# Patient Record
Sex: MALE | Race: WHITE | NOT HISPANIC OR LATINO | Employment: FULL TIME | URBAN - METROPOLITAN AREA
[De-identification: names, ages, dates, MRNs, and addresses within clinical notes are randomized per-mention and may not be internally consistent; named-entity substitution may affect disease eponyms.]

---

## 2017-01-31 ENCOUNTER — OFFICE VISIT (OUTPATIENT)
Dept: FAMILY MEDICINE | Facility: CLINIC | Age: 32
End: 2017-01-31

## 2017-01-31 VITALS
WEIGHT: 145.8 LBS | OXYGEN SATURATION: 98 % | RESPIRATION RATE: 20 BRPM | TEMPERATURE: 97.6 F | DIASTOLIC BLOOD PRESSURE: 77 MMHG | HEIGHT: 69 IN | SYSTOLIC BLOOD PRESSURE: 133 MMHG | BODY MASS INDEX: 21.59 KG/M2 | HEART RATE: 64 BPM

## 2017-01-31 DIAGNOSIS — Z11.3 SCREEN FOR STD (SEXUALLY TRANSMITTED DISEASE): ICD-10-CM

## 2017-01-31 DIAGNOSIS — R53.83 FATIGUE, UNSPECIFIED TYPE: Primary | ICD-10-CM

## 2017-01-31 LAB
BUN SERPL-MCNC: 16 MG/DL (ref 5–24)
CALCIUM SERPL-MCNC: 10 MG/DL (ref 8.5–10.4)
CHLORIDE SERPLBLD-SCNC: 97 MMOL/L (ref 94–109)
CO2 SERPL-SCNC: 29 MMOL/L (ref 20–32)
CREAT SERPL-MCNC: 0.8 MG/DL (ref 0.8–1.5)
EGFR CALCULATED (BLACK REFERENCE): 143.2 ML/MIN
EGFR CALCULATED (NON BLACK REFERENCE): 118.3 ML/MIN
GLUCOSE SERPL-MCNC: 82 MG/DL (ref 60–109)
POTASSIUM SERPL-SCNC: 4.1 MMOL/L (ref 3.4–5.3)
SODIUM SERPL-SCNC: 141 MMOL/L (ref 133–144)
TSH SERPL DL<=0.05 MIU/L-ACNC: 1.31 UIU/ML (ref 0.3–5)

## 2017-01-31 NOTE — PATIENT INSTRUCTIONS
- Try to increase your sleep to 8-9 hours a   - You can keep a sleep diary to log your sleep activities   - Follow-up in 1 months    Your medication list is printed, please keep this with you, it is helpful to bring this current list to any other medical appointments, the emergency room or hospital.    If you had lab testing today and your results are reassuring or normal they will be be mailed to you within 7 days.     If the lab tests need quick action we will call you with the results.  The phone number we will call with results is # [phone] (home) . If this is not the best number please call our clinic and change the number.    If you need any refills please call your pharmacy and they will contact us.    If you have any further concerns or wish to schedule another appointment you must call our office during normal business hours  [phone] (8-5:00 M-F)  If you have urgent medical questions that cannot wait  you may also call [phone] at any time of day.  If you have a medical emergency please call [phone].    Thank you for coming to [facility].

## 2017-01-31 NOTE — MR AVS SNAPSHOT
After Visit Summary   1/31/2017    Jovanny Tello    MRN: [id number]           Patient Information     Date Of Birth          1985        Visit Information        Provider Department      1/31/2017 3:00 PM Emma Malik MD [facility]        Today's Diagnoses     Fatigue, unspecified type    -  1       Care Instructions    - Try to increase your sleep to 8-9 hours a   - You can keep a sleep diary to log your sleep activities   - Follow-up in 1 months    Your medication list is printed, please keep this with you, it is helpful to bring this current list to any other medical appointments, the emergency room or hospital.    If you had lab testing today and your results are reassuring or normal they will be be mailed to you within 7 days.     If the lab tests need quick action we will call you with the results.  The phone number we will call with results is # [phone] (home) . If this is not the best number please call our clinic and change the number.    If you need any refills please call your pharmacy and they will contact us.    If you have any further concerns or wish to schedule another appointment you must call our office during normal business hours  [phone] (8-5:00 M-F)  If you have urgent medical questions that cannot wait  you may also call [phone] at any time of day.  If you have a medical emergency please call [phone].    Thank you for coming to [facility].          Follow-ups after your visit        Who to contact     Please call your clinic at [phone] to:    Ask questions about your health    Make or cancel appointments    Discuss your medicines    Learn about your test results    Speak to your doctor   If you have compliments or concerns about an experience at your clinic, or if you wish to file a complaint, please contact [facility] Patient Relations at [phone] or email us at  [email or web address]         Additional Information About Your Visit        [employer] Information     [employer] gives you secure access to your electronic health record. If you see a primary care provider, you can also send messages to your care team and make appointments. If you have questions, please call your primary care clinic.  If you do not have a primary care provider, please call [phone] and they will assist you.      [employer] is an electronic gateway that provides easy, online access to your medical records. With [employer] you can request a clinic appointment, read your test results, renew a prescription or communicate with your care team.     To access your existing account, please contact your [facility] or call [phone] for assistance.        Care EveryWhere ID     This is your Care EveryWhere ID. This could be used by other organizations to access your [facility] medical records  [id number]        Your Vitals Were     Pulse Temperature Respirations Height BMI (Body Mass Index) Pulse Oximetry    64 97.6  F (36.4  C) (Oral) 20 5' 9.25\" (175.9 cm) 21.37 kg/m2 98%       Blood Pressure from Last 3 Encounters:   01/31/17 133/77   10/20/16 133/75    Weight from Last 3 Encounters:   01/31/17 145 lb 12.8 oz (66.134 kg)   10/20/16 146 lb (66.225 kg)              We Performed the Following     Basic Metabolic Panel ([facility])  - Results < 1 hr     TSH  Sensitive ([facility])     Vitamin D 25-Hydroxy ([facility])        Primary Care Provider Office Phone # Fax #    [facility] -[phone]       [facility] [address] [place] [address]        Thank you!     Thank you for choosing [facility]  for your care. Our goal is always to provide you with excellent care. Hearing back from our patients is one way we can continue to improve our services. Please take a few minutes to complete the written survey that you may " receive in the mail after your visit with us. Thank you!             Your Updated Medication List - Protect others around you: Learn how to safely use, store and throw away your medicines at www.disposemymeds.org.      Notice  As of 1/31/2017  3:32 PM    You have not been prescribed any medications.

## 2017-01-31 NOTE — PROGRESS NOTES
[address] [place] [address] (health clinic).      Had a rash and was given   te  Reports chronic fatigue reports that he is frustrated by being tired all the time  Works for [employer]  Sleeps 6.5 to 7 hours a night  Feels like he is a light sleep  Denies excessive worry  Had a sleep study December 2015 and was told that the sleep study wasn't normal but he didn't need a CPAP     Doesn't have trouble falling asleep.  Girlfriend works until nine at night     At one point was weight lifting and felt like he pulled something in his neck and had an episode when he     Is going to have a kid at the end of March and is worried about being energetic enough for that.      In middle school some trouble with mood and depression (parents were ).    Drinks less than 3 tdrinks per night  Non-smoker  No elicits or street drugs      No pain    Chron's in family.             HPI:       Jovanny Tello is a previously healthy  31 year old male with recent visit for fatigue who returns to continue this discussion:      Ongoing fatigue:  Patient reports that sx began after a visit to [address] [place] [address].  Was given immunization for unknown disease and had a rash after the shots.  Since this time has experienced chronic fatigue.     Reports chronic fatigue reports that he is frustrated by being tired all the time  Works for [employer]  Sleeps 6.5 to 7 hours a night.  Unable to report how much sleep he would need to be rested.  Reports that on the weekends feels like he could sleep all day.  Denies fevers, sweats, chills, nausea, vomiting, changes in eating patterns, recurrent rash, HA, vision changes, or other systemic changes.   Denies excessive worry  Had a sleep study December 2015 and was told that the sleep study wasn't normal but he didn't need a CPAP     At previous visit for fatigue a CBC was obtained and was normal.      PMH/Social hx:   Patient girlfriend is going to have a child in the end  "of March.  Patient is worried about having enough energy to care for his child.   In middle school some trouble with mood and depression (parents were ).    Drinks less than 3 tdrinks per night  Non-smoker  No elicits or street drugs    No pain    Family history:  Chron's in family.               PMHX:     Patient Active Problem List   Diagnosis     Fatigue       No current outpatient prescriptions on file.          Allergies   Allergen Reactions     No Known Allergies        No results found for this or any previous visit (from the past 24 hour(s)).    No current outpatient prescriptions on file.     No current facility-administered medications for this visit.               Review of Systems:   ROS as described above.  Additionally denies kurtis pain and shortness of breath          Physical Exam:     Vitals:    01/31/17 1455   BP: 133/77   Pulse: 64   Resp: 20   Temp: 97.6  F (36.4  C)   TempSrc: Oral   SpO2: 98%   Weight: 145 lb 12.8 oz (66.1 kg)   Height: 5' 9.25\" (175.9 cm)     Body mass index is 21.38 kg/(m^2).    GEN: healthy appearing male sitting in chair  HEEN: Head is atraumatic, normocephalic, eyes anicteric, mucous membranes moist  CV: RRR w/o M/R/G  PULM: CTAB without w/r/r  ABD: soft, nontender, bowel sounds present  NEURO: Alert and oriented x3.  No focal motor abnormalities.  Face symmetric.  PSYCH: appropriate  SKIN: No rashes, bruising, or other lesions    Results for orders placed or performed in visit on 01/31/17   TSH  Sensitive ([facility])   Result Value Ref Range    TSH 1.31 0.30 - 5.00 uIU/mL    Narrative    Test performed by:  [facility]  [address] [place] [address]   Basic Metabolic Panel ([facility])  - Results < 1 hr   Result Value Ref Range    Glucose 82.0 60.0 - 109.0 mg/dL    Urea Nitrogen 16.0 5.0 - 24.0 mg/dL    Creatinine 0.8 0.8 - 1.5 mg/dL    Sodium 141.0 133.0 - 144.0 mmol/L    Potassium 4.1 3.4 - 5.3 mmol/L    Chloride 97.0 94.0 - 109.0 mmol/L    " Carbon Dioxide 29.0 20.0 - 32.0 mmol/L    Calcium 10.0 8.5 - 10.4 mg/dL    eGFR Calculated (Non Black Reference) 118.3 >60.0 mL/min    eGFR Calculated (Black Reference) 143.2 >60.0 mL/min   Vitamin D 25-Hydroxy ([facility])   Result Value Ref Range    Vitamin D,25-Hydroxy 17.7 (L) 30.0 - 80.0 ng/mL    Narrative    Test performed by:  [facility] [address] [place] [address]  Deficiency <10.0 ng/mL  Insufficiency 10.0-29.9 ng/mL  Sufficiency 30.0-80.0 ng/mL  Toxicity (possible) >100.0 ng/mL   HIV Ag/Ab Screen Mono ([facility])   Result Value Ref Range    HIV Antigen/Antibody Negative Negative    Narrative    Test performed by:  [facility] [address] [place] [address]       Assessment and Plan     1. Fatigue, unspecified type-patient without other sx to link to fatigue.  Is getting less sleep per night than recommended.  Will add to screening labs for patinet.  Could add inflammatory labs if achieving adequate sleep and sx unchanged.  Patient will return in 3 months to discuss, sooner if sx progressive or patient develops new sx.   - [facility]  - Basic Metabolic Panel ([facility])  - Results < 1 hr  - Vitamin D 25-Hydroxy ([facility])    2. Screen for STD (sexually transmitted disease)  - HIV Ag/Ab Screen Mono ([facility])      Options for treatment and follow-up care were reviewed with the patient and/or guardian. Jovanny Tello and/or guardian engaged in the decision making process and verbalized understanding of the options discussed and agreed with the final plan.    Emma Malik MD

## 2017-01-31 NOTE — Clinical Note
February 2, 2017      Jovanny Elisha  [address][1] [id number]        Dear Jovanny,    Please see below for your test results.   Your vitamin D is mildly low.  I recommend a daily Vitamin D supplement of 1000iu or 2000iu daily.  Otherwise testing is normal.     Resulted Orders   TSH  Sensitive ([facility])   Result Value Ref Range    TSH 1.31 0.30 - 5.00 uIU/mL    Narrative    Test performed by:  [facility]  [address] [place] [address]   Basic Metabolic Panel ([facility])  - Results < 1 hr   Result Value Ref Range    Glucose 82.0 60.0 - 109.0 mg/dL    Urea Nitrogen 16.0 5.0 - 24.0 mg/dL    Creatinine 0.8 0.8 - 1.5 mg/dL    Sodium 141.0 133.0 - 144.0 mmol/L    Potassium 4.1 3.4 - 5.3 mmol/L    Chloride 97.0 94.0 - 109.0 mmol/L    Carbon Dioxide 29.0 20.0 - 32.0 mmol/L    Calcium 10.0 8.5 - 10.4 mg/dL    eGFR Calculated (Non Black Reference) 118.3 >60.0 mL/min    eGFR Calculated (Black Reference) 143.2 >60.0 mL/min   Vitamin D 25-Hydroxy ([facility])   Result Value Ref Range    Vitamin D,25-Hydroxy 17.7 (L) 30.0 - 80.0 ng/mL    Narrative    Test performed by:  [facility] [address] [place] [address]  Deficiency <10.0 ng/mL  Insufficiency 10.0-29.9 ng/mL  Sufficiency 30.0-80.0 ng/mL  Toxicity (possible) >100.0 ng/mL       If you have any questions, please call the clinic to make an appointment.    Sincerely,    Emma Malik MD

## 2017-02-01 LAB — 25(OH)D3 SERPL-MCNC: 17.7 NG/ML (ref 30–80)

## 2017-02-06 LAB — HIV 1+2 AB+HIV1 P24 AG SERPL QL IA: NEGATIVE

## 2017-02-13 ENCOUNTER — TELEPHONE (OUTPATIENT)
Dept: FAMILY MEDICINE | Facility: CLINIC | Age: 32
End: 2017-02-13

## 2017-02-13 NOTE — TELEPHONE ENCOUNTER
Normal results from 01/31/2017 given to patient. Told Patient that his HIV lab was negative. He states he called last week and asked someone and they told him his results already that it was negative.

## 2018-09-11 ENCOUNTER — OFFICE VISIT (OUTPATIENT)
Dept: FAMILY MEDICINE | Facility: CLINIC | Age: 33
End: 2018-09-11
Payer: COMMERCIAL

## 2018-09-11 VITALS
OXYGEN SATURATION: 98 % | HEART RATE: 74 BPM | TEMPERATURE: 97.5 F | WEIGHT: 137 LBS | RESPIRATION RATE: 12 BRPM | DIASTOLIC BLOOD PRESSURE: 71 MMHG | SYSTOLIC BLOOD PRESSURE: 112 MMHG | BODY MASS INDEX: 20.29 KG/M2 | HEIGHT: 69 IN

## 2018-09-11 DIAGNOSIS — T63.304A SPIDER BITE WOUND, UNDETERMINED INTENT, INITIAL ENCOUNTER: Primary | ICD-10-CM

## 2018-09-11 NOTE — PATIENT INSTRUCTIONS
OK to use benadryl at night for the next 2-3 nights  Cream to rash as needed  Wash all bedding in hot water  Local Reaction to a Spider Bite    The venom from a spider bite can cause a local skin reaction. This often causes local redness, itching, and swelling. This reaction will fade over a few hours to a few days. A spider bite can become infected, so watch for the signs listed below. Sometimes it is hard to tell the difference between a local reaction to the insect bite or sting and an early infection. Because of this, your healthcare provider may start you on antibiotics.  Home care  The following guidelines will help you care for your wound at home:    Stay away from anything that heats up your skin if itching is a problem. This includes hot showers or baths or direct sunlight. This will make the itching worse.    During the first 24 hours, you may put an ice pack on the injury. Use it for no more than 20 minutes at a time every 1 to 2 hours. This will reduce pain and swelling. You can make an ice pack by putting ice cubes in a zip-top plastic bag and wrapping it in a thin towel. You may also use an over-the-counter spray or cream containing benzocaine to help relieve pain. Over-the-counter skin creams containing diphenhydramine or hydrocortisone may help with itching. Remember to review the medicine instructions for any allergies.    If the wound becomes red, wash the area with soap and water every day.  Put an antibiotic cream or ointment on the injury 3 times a day.    If your doctor has prescribed oral antibiotics, be sure to take them as directed until they are all finished.  Follow-up care  Follow up with your healthcare provider, or as advised.  When to seek medical advice  Call your healthcare provider right away if any of these occur:    Spreading areas of itching, redness, or swelling    Pain or swelling that gets worse    Fever of 100.4 F (38 C) or higher, or as directed by your healthcare  provider    Colored fluid draining from the wound    You get a skin ulcer    A red streak in the skin leading away from the wound    You still have symptoms after 3 days    Generalized rash, fever, or joint pain starting 1 to 2 weeks after treatment  Call 911  Call 911 if any of the following occur:    New or worse swelling in the face, eyelids, lips, mouth, throat, or tongue    Hard time swallowing or breathing    Dizziness, weakness, or fainting  Date Last Reviewed: 10/1/2016    5902-9889 [employer]. [address] [place] [address]. All rights reserved. This information is not intended as a substitute for professional medical care. Always follow your healthcare professional's instructions.

## 2018-09-11 NOTE — PROGRESS NOTES
"               HPI:       Jovanny Tello is a 32 year old  male without a significant past medical history who presents for the new concern(s) of    1. Red lesion: Right upper shoulder, a circular red lesion occurred 2 weeks ago, redness and a red line formed, but resolved on its own with just the intervention of hydrocortisone cream, his skin was itchy and felt warm, no fevers, no recent travel, then on Sunday evening he had a very similar lesion on the left side of his neck, had a few spiders in his room noticed when cleaning,  He sleeps during the day, but his girlfriend sleeps at night without issue. He has no lesions on his feet or hands. No URI type symptoms         PMHX:     Patient Active Problem List   Diagnosis     Fatigue       No current outpatient prescriptions on file.              Allergies   Allergen Reactions     No Known Allergies        No results found for this or any previous visit (from the past 24 hour(s)).           Physical Exam:     Vitals:    09/11/18 1628   BP: 112/71   Pulse: 74   Resp: 12   Temp: 97.5  F (36.4  C)   TempSrc: Oral   SpO2: 98%   Weight: 137 lb (62.1 kg)   Height: 5' 9.49\" (176.5 cm)     Body mass index is 19.95 kg/(m^2).    GENERAL APPEARANCE: healthy, alert and no distress,  RESP: lungs clear to auscultation - no rales, rhonchi or wheezes  CV: regular rate and rhythm,  and no murmur, click,  rub or gallop  SKIN: red lesion at right upper shoulder and left neck. The red lesion at left neck has a single red line that tracks downward toward his chest      Assessment and Plan     1. Spider bite wound, undetermined intent, initial encounter  There is a single red line extending from circular lesion, but the area is not painful, and the other area resolved on its own with no intervention, so discussed benadryl and hydrocortisone cream      Options for treatment and follow-up care were reviewed with the patient and/or guardian. Jovanny Tello and/or guardian engaged in " the decision making process and verbalized understanding of the options discussed and agreed with the final plan.    Lore King, DO

## 2018-09-11 NOTE — MR AVS SNAPSHOT
"              After Visit Summary   9/11/2018    Jovanny Tello    MRN: [id number]           Patient Information     Date Of Birth          1985        Visit Information        Provider Department      9/11/2018 4:20 PM Budd, Jennifer, DO [facility]        Today's Diagnoses     Spider bite wound, undetermined intent, initial encounter    -  1      Care Instructions    OK to use benadryl at night for the next 2-3 nights  Cream to rash as needed  Wash all bedding in hot water          Follow-ups after your visit        Who to contact     Please call your clinic at [phone] to:    Ask questions about your health    Make or cancel appointments    Discuss your medicines    Learn about your test results    Speak to your doctor            Additional Information About Your Visit        [employer] Information     [employer] gives you secure access to your electronic health record. If you see a primary care provider, you can also send messages to your care team and make appointments. If you have questions, please call your primary care clinic.  If you do not have a primary care provider, please call [phone] and they will assist you.      [employer] is an electronic gateway that provides easy, online access to your medical records. With [employer], you can request a clinic appointment, read your test results, renew a prescription or communicate with your care team.     To access your existing account, please contact your [facility] or call [phone] for assistance.        Care EveryWhere ID     This is your Care EveryWhere ID. This could be used by other organizations to access your [facility] medical records  [id number]        Your Vitals Were     Pulse Temperature Respirations Height Pulse Oximetry BMI (Body Mass Index)    74 97.5  F (36.4  C) (Oral) 12 5' 9.49\" (176.5 cm) 98% 19.95 kg/m2       Blood Pressure from Last 3 Encounters:   09/11/18 112/71   01/31/17 133/77 "   10/20/16 133/75    Weight from Last 3 Encounters:   09/11/18 137 lb (62.1 kg)   01/31/17 145 lb 12.8 oz (66.1 kg)   10/20/16 146 lb (66.2 kg)              Today, you had the following     No orders found for display       Primary Care Provider Office Phone # Fax #    [facility] [phone]       [facility] [address] [place] [address]        Equal Access to Services     [facility] : Hadii aad ku hadasho Soomaali, waaxda luqadaha, qaybta kaalmada adeegyada, waxay idiin hayaan jorge navarro . So [facility] [phone].    ATENCIÓN: Si habla español, tiene a fields disposición servicios gratuitos de asistencia lingüística. Llame al [phone].    We comply with applicable federal civil rights laws and [place] laws. We do not discriminate on the basis of race, color, national origin, age, disability, sex, sexual orientation, or gender identity.            Thank you!     Thank you for choosing [facility]  for your care. Our goal is always to provide you with excellent care. Hearing back from our patients is one way we can continue to improve our services. Please take a few minutes to complete the written survey that you may receive in the mail after your visit with us. Thank you!             Your Updated Medication List - Protect others around you: Learn how to safely use, store and throw away your medicines at www.disposemymeds.org.      Notice  As of 9/11/2018  4:59 PM    You have not been prescribed any medications.

## 2018-12-31 ENCOUNTER — OFFICE VISIT (OUTPATIENT)
Dept: FAMILY MEDICINE | Facility: CLINIC | Age: 33
End: 2018-12-31
Payer: COMMERCIAL

## 2018-12-31 VITALS
TEMPERATURE: 97.7 F | SYSTOLIC BLOOD PRESSURE: 131 MMHG | OXYGEN SATURATION: 99 % | BODY MASS INDEX: 21.21 KG/M2 | RESPIRATION RATE: 18 BRPM | WEIGHT: 143.2 LBS | HEART RATE: 63 BPM | DIASTOLIC BLOOD PRESSURE: 78 MMHG | HEIGHT: 69 IN

## 2018-12-31 DIAGNOSIS — Z23 NEEDS FLU SHOT: Primary | ICD-10-CM

## 2018-12-31 ASSESSMENT — MIFFLIN-ST. JEOR: SCORE: 1574.93

## 2018-12-31 NOTE — PROGRESS NOTES
Male Physical Note      Concerns today: No special concerns today.   Patient would like to make sure to get his annual physical exam done in 2018 to comply with his insurance recommendations and stimulus package..         ROS:                      CONSTITUTIONAL: no fatigue, no unexpected change in weight  SKIN: no worrisome rashes, no worrisome moles, no worrisome lesions  EYES: no acute vision problems or changes  ENT: no ear problems, no mouth problems, no throat problems  RESP: no significant cough, no shortness of breath  CV: no chest pain, no palpitations, no new or worsening peripheral edema  GI: no nausea, no vomiting, no constipation, no diarrhea    Past Medical History:   Diagnosis Date     NO ACTIVE PROBLEMS         Family History   Problem Relation Age of Onset     Lung Cancer Paternal Uncle      Heart Disease Paternal Grandfather      Reviewed no other significant FH           Family History and past Medical History reviewed and unchanged/updated.    Social History     Tobacco Use     Smoking status: Never Smoker     Smokeless tobacco: Never Used   Substance Use Topics     Alcohol use: Yes     Alcohol/week: 0.0 oz     Comment: Casually.    Drinking 2 times per week, 3 beers per day.   Single, but ;clemente with a women who is the mother of his child.   Children ? yes 21 months.     Has anyone hurt you physically, for example by pushing, hitting, slapping or kicking you or forcing you to have sex? Denies  Do you feel threatened or controlled by a partner, ex-partner or anyone in your life? Denies    RISK BEHAVIORS AND HEALTHY HABITS:  Tobacco Use/Smoking: None  Illicit Drug Use: None  ETOH: None and Details 3 beers twice per week.   Do you use alcohol? Yes  Number of drinks per day no  Number of drinking days a week 6  Sexually Active: Yes  Diet (5-7 servings of fruits/veg daily): No   Exercise (30 min accumulated most days):Yes  Dental Care: No   Calcium 1500 mg/d:  unknown  Seat Belt Use: Yes       No  "additional screening due.        CV Risk based on Pooled Cohort Risk:No able to calculate without lipid screen.        Immunization History   Administered Date(s) Administered     Influenza Vaccine IM 3yrs+ 4 Valent IIV4 12/31/2018     TDAP Vaccine (Boostrix) 08/25/2006, 10/20/2016    Reviewed Immunization Record Today    EXAMINATION:  /78   Pulse 63   Temp 97.7  F (36.5  C) (Oral)   Resp 18   Ht 1.753 m (5' 9\")   Wt 65 kg (143 lb 3.2 oz)   SpO2 99%   BMI 21.15 kg/m    GENERAL: healthy, alert and no distress  EYES: Eyes grossly normal to inspection, extraocular movements - intact, and PERRL  HENT: ear canals- normal; TMs- normal; Nose- normal; Mouth- no ulcers, no lesions  NECK: no tenderness, no adenopathy, no asymmetry, no masses, no stiffness; thyroid- normal to palpation  RESP: lungs clear to auscultation - no rales, no rhonchi, no wheezes  BREAST: no masses, no tenderness, no nipple discharge, no palpable axillary masses or adenopathy  CV: regular rates and rhythm, normal S1 S2, no S3 or S4 and no murmur, no click or rub -  ABDOMEN: soft, no tenderness, no  hepatosplenomegaly, no masses, normal bowel sounds  MS: extremities- no gross deformities noted, no edema  SKIN: no suspicious lesions, no rashes  NEURO: strength and tone- normal, sensory exam- grossly normal, mentation- intact, speech- normal, reflexes- symmetric  BACK: no CVA tenderness, no paralumbar tenderness  - male: testicles- normal, no atrophy, no masses;  no inguinal hernias  RECTAL- male: no masses, no hemorhoids, Prostate- symmetric, no  nodularity, no masses, no hypertrophy  PSYCH: Alert and oriented times 3; speech- coherent , normal rate and volume; able to articulate logical thoughts, able to abstract reason, no tangential thoughts, no hallucinations or delusions, affect- normal  LYMPHATICS: ant. cervical- normal, post. cervical- normal, axillary- normal, supraclavicular- normal, inguinal- normal    ASSESSMENT:  1. Health Care " Maintenance: Normal Physical Exam    PLAN:  1.Routine follow up in one year.  Results cited below have been reviewed and communicated to patient.

## 2018-12-31 NOTE — NURSING NOTE
Injectable influenza vaccine documentation    1. Has the patient received the information for the influenza vaccine? YES    2. Does the patient have a severe allergy to eggs (Patients with a severe egg allergy should be assessed by a medical provider, RN, or clinical pharmacist. If they receive the influenza vaccine, please have them observed for 15 minutes.)? No    3. Has the patient had an allergic reaction to previous influenza vaccines? No    4. Has the patient had any severe allergic reactions to past influenza vaccines ? No       5. Does patient have a history of Guillain-[place] syndrome? No      Based on responses above, I administered the influenza vaccine.  Brittney Carroll

## 2019-11-08 ENCOUNTER — HEALTH MAINTENANCE LETTER (OUTPATIENT)
Age: 34
End: 2019-11-08

## 2020-02-23 ENCOUNTER — HEALTH MAINTENANCE LETTER (OUTPATIENT)
Age: 35
End: 2020-02-23

## 2020-12-06 ENCOUNTER — HEALTH MAINTENANCE LETTER (OUTPATIENT)
Age: 35
End: 2020-12-06

## 2020-12-31 ENCOUNTER — OFFICE VISIT (OUTPATIENT)
Dept: FAMILY MEDICINE | Facility: CLINIC | Age: 35
End: 2020-12-31
Payer: COMMERCIAL

## 2020-12-31 VITALS
SYSTOLIC BLOOD PRESSURE: 122 MMHG | BODY MASS INDEX: 20.64 KG/M2 | WEIGHT: 144.2 LBS | DIASTOLIC BLOOD PRESSURE: 74 MMHG | HEIGHT: 70 IN | HEART RATE: 59 BPM | OXYGEN SATURATION: 99 % | TEMPERATURE: 97.4 F

## 2020-12-31 DIAGNOSIS — Z00.00 ROUTINE GENERAL MEDICAL EXAMINATION AT A HEALTH CARE FACILITY: Primary | ICD-10-CM

## 2020-12-31 PROCEDURE — 99395 PREV VISIT EST AGE 18-39: CPT | Mod: GC | Performed by: STUDENT IN AN ORGANIZED HEALTH CARE EDUCATION/TRAINING PROGRAM

## 2020-12-31 RX ORDER — MULTIPLE VITAMINS W/ MINERALS TAB 9MG-400MCG
1 TAB ORAL DAILY
COMMUNITY

## 2020-12-31 ASSESSMENT — MIFFLIN-ST. JEOR: SCORE: 1580.34

## 2020-12-31 NOTE — PROGRESS NOTES
"Male Physical Note         CC     Jovanny Tello is an 35 year old male who presents for preventative health visit.     Past Medical History:   Diagnosis Date     NO ACTIVE PROBLEMS           HPI     Besides routine health maintenance, he has no other health concerns today .          Health Screening, Medical and Social History     Diet: Does eat a balanced diet including fruits and vegetables  Calcium:  Yes - multivitamin  Exercise (30 min accumulated most days):Yes  Dental Care: No - has not been within the past year due to COVID   Seat Belt Use: Yes     Tobacco Use/Smoking: None  Illicit Drug Use: occasional marijuana use  Do you use alcohol? Yes, socially on the weekends    Sexually Active: Yes  Sexual concerns: No   STD History: Neg    Lives with his partner and their daughter. Works in retail.     Abuse: Current or Past(Physical, Sexual or Emotional)- No  Do you feel safe in your environment - Yes    Reviewed health maintenance and updated orders accordingly - Yes    All Histories reviewed and updated in Epic.         Review of Systems:     CONSTITUTIONAL: NEGATIVE for fever, chills, change in weight  INTEGUMENTARY/SKIN: NEGATIVE for worrisome rashes, moles or lesions  EYES: NEGATIVE for vision changes or irritation  ENT: NEGATIVE for ear, mouth and throat problems  RESP: NEGATIVE for significant cough or SOB  CV: NEGATIVE for chest pain, palpitations or peripheral edema  GI: NEGATIVE for nausea, abdominal pain, heartburn, or change in bowel habits   male: negative for dysuria, hematuria, decreased urinary stream, erectile dysfunction, urethral discharge  MUSCULOSKELETAL: NEGATIVE for significant arthralgias or myalgia  NEURO: NEGATIVE for weakness, dizziness or paresthesias  PSYCHIATRIC: NEGATIVE for changes in mood or affect         Physical Exam:     /74   Pulse 59   Temp 97.4  F (36.3  C) (Oral)   Ht 1.77 m (5' 9.69\")   Wt 65.4 kg (144 lb 3.2 oz)   SpO2 99%   BMI 20.88 kg/m    GENERAL " APPEARANCE: healthy, alert and no distress  EYES: Eyes grossly normal to inspection  HENT: ear canals and TM's normal, nose and mouth without ulcers or lesions, oropharynx clear and oral mucous membranes moist  RESP: lungs clear to auscultation - no rales, rhonchi or wheezes  CV: regular rates and rhythm, normal S1 S2, no S3 or S4, no murmur, click or rub, no peripheral edema and peripheral pulses strong  ABDOMEN: soft, non-distended  MS: no musculoskeletal defects are noted and gait is age appropriate without ataxia  SKIN: no suspicious lesions or rashes  NEURO: Normal strength and tone, sensory exam grossly normal, mentation intact and speech normal  PSYCH: mentation appears normal and affect normal/bright    Assessment and Plan:      Jovanny was seen today for physical and medication reconciliation.    Diagnoses and all orders for this visit:    Routine general medical examination at a health care facility  Counseling:  Reviewed preventive health counseling, as reflected in patient instructions       Regular exercise       Healthy diet/nutrition       Regular dental care    Follow up in 1 year for routine medical exam.    Albertina Estes MD  [facility] Resident  P: [phone]    Precepted today with: Memo Low MD

## 2021-01-05 NOTE — PROGRESS NOTES
I have personally reviewed the history and examination as documented by Dr. Estes.  I was present during key portions of the visit and agree with the assessment and plan as documented for 35 yr old male here for well visit. No medical concerns. Age-appropriate health maintenance and anticipatory guidance given.     Memo Low MD  January 5, 2021  2:34 PM

## 2021-09-25 ENCOUNTER — HEALTH MAINTENANCE LETTER (OUTPATIENT)
Age: 36
End: 2021-09-25

## 2021-11-04 ENCOUNTER — OFFICE VISIT (OUTPATIENT)
Dept: FAMILY MEDICINE | Facility: CLINIC | Age: 36
End: 2021-11-04
Payer: COMMERCIAL

## 2021-11-04 VITALS
BODY MASS INDEX: 21.03 KG/M2 | TEMPERATURE: 98.1 F | WEIGHT: 142 LBS | SYSTOLIC BLOOD PRESSURE: 124 MMHG | RESPIRATION RATE: 16 BRPM | DIASTOLIC BLOOD PRESSURE: 75 MMHG | HEIGHT: 69 IN | HEART RATE: 71 BPM | OXYGEN SATURATION: 97 %

## 2021-11-04 DIAGNOSIS — Z00.00 ROUTINE GENERAL MEDICAL EXAMINATION AT A HEALTH CARE FACILITY: Primary | ICD-10-CM

## 2021-11-04 PROCEDURE — 99395 PREV VISIT EST AGE 18-39: CPT | Mod: GC | Performed by: MASSAGE THERAPIST

## 2021-11-04 ASSESSMENT — MIFFLIN-ST. JEOR: SCORE: 1560.98

## 2021-11-04 NOTE — PROGRESS NOTES
Male Physical Note      Concerns today: No special concerns today.      ROS:                      CONSTITUTIONAL: no fatigue, no unexpected change in weight  SKIN: no worrisome rashes, no worrisome moles, no worrisome lesions  EYES: no acute vision problems or changes  ENT: no ear problems, no mouth problems, no throat problems  RESP: no significant cough, no shortness of breath  CV: no chest pain, no palpitations, no new or worsening peripheral edema  GI: no nausea, no vomiting, no constipation, no diarrhea    Past Medical History:   Diagnosis Date     NO ACTIVE PROBLEMS         Family History   Problem Relation Age of Onset     Lung Cancer Paternal Uncle      Heart Disease Paternal Grandfather      Hypertension Father      Diabetes No family hx of      Reviewed no other significant FH           Family History and past Medical History reviewed and unchanged/updated.    Social History     Tobacco Use     Smoking status: Never Smoker     Smokeless tobacco: Never Used   Substance Use Topics     Alcohol use: Yes     Alcohol/week: 0.0 standard drinks     Comment: weekly 3-4 beers on weekend        Children ? Yes, 1 4 year old.  2nd baby due January.     Has anyone hurt you physically, for example by pushing, hitting, slapping or kicking you or forcing you to have sex? Denies  Do you feel threatened or controlled by a partner, ex-partner or anyone in your life? Denies    RISK BEHAVIORS AND HEALTHY HABITS:  Tobacco Use/Smoking: None  Illicit Drug Use: None  ETOH: Details- about 2 per day, occasionally 5 in setting.   Sexually Active: Yes, 1 female partner   Diet (5-7 servings of fruits/veg daily): Yes   Exercise (30 min accumulated most days):Yes, bike, jog, physical job, most days   Dental Care: No , verbal referral made   Seat Belt Use: Yes   Sleep: no concerns, new work schedule, 7 hours per night   Stress management: exercise  Takes multi vitamin and vitamin D     Immunization History   Administered Date(s)  Administered     Influenza Vaccine IM > 6 months Valent IIV4 (Alfuria,Fluzone) 12/31/2018, 09/30/2020     TDAP Vaccine (Boostrix) 08/25/2006, 10/20/2016     Reviewed Immunization Record Today    EXAMINATION:  There were no vitals taken for this visit.  GENERAL: healthy, alert and no distress  EYES: Eyes grossly normal to inspection, extraocular movements - intact, and PERRL  HENT: ear canals- normal; TMs- normal; Nose- normal; Mouth- no ulcers, no lesions  NECK: no tenderness, no adenopathy, no asymmetry, no masses, no stiffness  RESP: lungs clear to auscultation - no rales, no rhonchi, no wheezes  CV: regular rates and rhythm, normal S1 S2, no S3 or S4 and no murmur, no click or rub -  ABDOMEN: soft, no tenderness, no  hepatosplenomegaly, no masses, normal bowel sounds  MS: extremities- no gross deformities noted, no edema  SKIN: no suspicious lesions, no rashes  NEURO: strength and tone- normal, sensory exam- grossly normal, mentation- intact, speech- normal, reflexes- symmetric  PSYCH: Alert and oriented times 3; speech- coherent , normal rate and volume; able to articulate logical thoughts, able to abstract reason, no tangential thoughts, no hallucinations or delusions, affect- normal  LYMPHATICS: ant. cervical- normal, post. cervical- normal    ASSESSMENT:  1. Health Care Maintenance: Normal Physical Exam    PLAN:  1.Routine follow up in one year.  2. Hepatitis C screening, Lipid panel at next years visit

## 2021-11-04 NOTE — PROGRESS NOTES
Preceptor Attestation:   Patient seen, evaluated and discussed with the resident. I have verified the content of the note, which accurately reflects my assessment of the patient and the plan of care.  Supervising Physician:Jovanny Villafana MD  [facility]

## 2022-12-30 ENCOUNTER — OFFICE VISIT (OUTPATIENT)
Dept: FAMILY MEDICINE | Facility: CLINIC | Age: 37
End: 2022-12-30
Payer: COMMERCIAL

## 2022-12-30 VITALS
SYSTOLIC BLOOD PRESSURE: 119 MMHG | DIASTOLIC BLOOD PRESSURE: 75 MMHG | BODY MASS INDEX: 21.65 KG/M2 | RESPIRATION RATE: 20 BRPM | HEART RATE: 88 BPM | WEIGHT: 147 LBS | TEMPERATURE: 98.7 F | OXYGEN SATURATION: 98 %

## 2022-12-30 DIAGNOSIS — Z57.9 OCCUPATIONAL EXPOSURE IN WORKPLACE: ICD-10-CM

## 2022-12-30 DIAGNOSIS — Z23 NEED FOR PROPHYLACTIC VACCINATION AND INOCULATION AGAINST INFLUENZA: ICD-10-CM

## 2022-12-30 DIAGNOSIS — Z00.00 ROUTINE GENERAL MEDICAL EXAMINATION AT A HEALTH CARE FACILITY: Primary | ICD-10-CM

## 2022-12-30 LAB
CHOLEST SERPL-MCNC: 168 MG/DL
HDLC SERPL-MCNC: 61 MG/DL
LDLC SERPL CALC-MCNC: 98 MG/DL
NONHDLC SERPL-MCNC: 107 MG/DL
TRIGL SERPL-MCNC: 47 MG/DL

## 2022-12-30 PROCEDURE — 83655 ASSAY OF LEAD: CPT | Mod: 90 | Performed by: STUDENT IN AN ORGANIZED HEALTH CARE EDUCATION/TRAINING PROGRAM

## 2022-12-30 PROCEDURE — 80061 LIPID PANEL: CPT | Performed by: STUDENT IN AN ORGANIZED HEALTH CARE EDUCATION/TRAINING PROGRAM

## 2022-12-30 PROCEDURE — 36415 COLL VENOUS BLD VENIPUNCTURE: CPT | Performed by: STUDENT IN AN ORGANIZED HEALTH CARE EDUCATION/TRAINING PROGRAM

## 2022-12-30 PROCEDURE — 86803 HEPATITIS C AB TEST: CPT | Performed by: STUDENT IN AN ORGANIZED HEALTH CARE EDUCATION/TRAINING PROGRAM

## 2022-12-30 PROCEDURE — 99395 PREV VISIT EST AGE 18-39: CPT | Mod: 25 | Performed by: STUDENT IN AN ORGANIZED HEALTH CARE EDUCATION/TRAINING PROGRAM

## 2022-12-30 PROCEDURE — 90471 IMMUNIZATION ADMIN: CPT | Performed by: STUDENT IN AN ORGANIZED HEALTH CARE EDUCATION/TRAINING PROGRAM

## 2022-12-30 PROCEDURE — 90686 IIV4 VACC NO PRSV 0.5 ML IM: CPT | Performed by: STUDENT IN AN ORGANIZED HEALTH CARE EDUCATION/TRAINING PROGRAM

## 2022-12-30 PROCEDURE — 99000 SPECIMEN HANDLING OFFICE-LAB: CPT | Performed by: STUDENT IN AN ORGANIZED HEALTH CARE EDUCATION/TRAINING PROGRAM

## 2022-12-30 SDOH — HEALTH STABILITY - PHYSICAL HEALTH: OCCUPATIONAL EXPOSURE TO UNSPECIFIED RISK FACTOR: Z57.9

## 2022-12-30 ASSESSMENT — ENCOUNTER SYMPTOMS
PARESTHESIAS: 0
HEADACHES: 0
ARTHRALGIAS: 0
COUGH: 0
JOINT SWELLING: 0
WEAKNESS: 0
FREQUENCY: 0
SORE THROAT: 0
FEVER: 0
CHILLS: 0
HEMATURIA: 0
HEARTBURN: 0
PALPITATIONS: 0
ABDOMINAL PAIN: 0
DYSURIA: 0
EYE PAIN: 0
NERVOUS/ANXIOUS: 0
CONSTIPATION: 0
DIZZINESS: 0
DIARRHEA: 0
SHORTNESS OF BREATH: 0
HEMATOCHEZIA: 0
MYALGIAS: 0
NAUSEA: 0

## 2022-12-30 NOTE — PROGRESS NOTES
SUBJECTIVE:   CC: Jovanny is an 37 year old who presents for preventative health visit.   HPI      with 2 kids.   Works at [employer] and as .  Has been well lately and has no complaints.    Today's PHQ-2 Score:   PHQ-2 ( 1999 Pfizer) 12/30/2022   Q1: Little interest or pleasure in doing things 0   Q2: Feeling down, depressed or hopeless 0   PHQ-2 Score 0   PHQ-2 Total Score (12-17 Years)- Positive if 3 or more points; Administer PHQ-A if positive -   Q1: Little interest or pleasure in doing things Not at all   Q2: Feeling down, depressed or hopeless Not at all   PHQ-2 Score 0       Social History     Tobacco Use     Smoking status: Never     Smokeless tobacco: Never   Substance Use Topics     Alcohol use: Yes     Alcohol/week: 0.0 standard drinks     Comment: weekly 3-4 beers on weekend      Reviewed orders with patient. Reviewed health maintenance and updated orders accordingly - Yes    Reviewed and updated as needed this visit by clinical staff   Tobacco  Allergies  Meds  Problems  Med Hx  Surg Hx  Fam Hx          Reviewed and updated as needed this visit by Provider   Tobacco  Allergies  Meds  Problems  Med Hx  Surg Hx  Fam Hx             Review of Systems  CONSTITUTIONAL: NEGATIVE for fever, chills, change in weight  INTEGUMENTARY/SKIN: NEGATIVE for worrisome rashes, moles or lesions  EYES: NEGATIVE for vision changes or irritation  ENT: NEGATIVE for ear, mouth and throat problems  RESP: NEGATIVE for significant cough or SOB  CV: NEGATIVE for chest pain, palpitations or peripheral edema  GI: NEGATIVE for nausea, abdominal pain, heartburn, or change in bowel habits   male: negative for dysuria, hematuria, decreased urinary stream, erectile dysfunction, urethral discharge  MUSCULOSKELETAL: NEGATIVE for significant arthralgias or myalgia  NEURO: NEGATIVE for weakness, dizziness or paresthesias  PSYCHIATRIC: NEGATIVE for changes in mood or affect    OBJECTIVE:   /75    Pulse 88   Temp 98.7  F (37.1  C) (Oral)   Resp 20   Wt 66.7 kg (147 lb)   SpO2 98%   BMI 21.65 kg/m      Physical Exam  General: Sitting comfortably. No acute distress.   HEENT: Conjunctivae are clear, nonicteric. EOMI. Symmetric pupils.  Neck: No masses appreciated. Normal ROM.  Respiratory: No respiratory distress. Lung sounds are clear without rales, ronchi, or wheezes.   Cardiac: RRR. No m/g/r. Brisk cap refill. Warm and well perfused.  Abdominal: Abdomen is soft and non-tender without distention.  Extremities: Upper and lower extremities grossly normal.  Skin: Skin in warm without rashes.  Neurological: Motor function is grossly normal. Normal gait.  Psychiatric: Good insight. Normal affect.    ASSESSMENT/PLAN:   (Z00.00) Routine general medical examination at a health care facility  (primary encounter diagnosis)  Comment: Healthy 38 yo male. Does drink ~8 beers a week and discussed this and potential long term effect to health of chronic regular alcohol use. Non smoker. No significant family hx. Due for HCV screening and lipids. Cognizant of what he eats and does a good job of getting regular exercise outside of his work.  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 Asymptomatic.  Plan:  -LEAD VENOUS BLOOD        COUNSELING:   Reviewed preventive health counseling, as reflected in patient instructions        He reports that he has never smoked. He has never used smokeless tobacco.      Abelardo Kauffman MD  [facility]

## 2022-12-31 LAB — HCV AB SERPL QL IA: NONREACTIVE

## 2022-12-31 NOTE — PROGRESS NOTES
Preceptor Attestation:  Patient's case reviewed and discussed with the resident, Abelardo Kauffman MD, and I personally evaluated the patient. I agree with written assessment and plan of care.    Supervising Physician:  Trudi Corey MD   [facility]

## 2023-01-01 LAB — LEAD BLDV-MCNC: 2.8 UG/DL

## 2024-02-10 ENCOUNTER — HEALTH MAINTENANCE LETTER (OUTPATIENT)
Age: 39
End: 2024-02-10

## 2025-03-02 ENCOUNTER — HEALTH MAINTENANCE LETTER (OUTPATIENT)
Age: 40
End: 2025-03-02